# Patient Record
Sex: MALE | Race: WHITE | NOT HISPANIC OR LATINO | URBAN - METROPOLITAN AREA
[De-identification: names, ages, dates, MRNs, and addresses within clinical notes are randomized per-mention and may not be internally consistent; named-entity substitution may affect disease eponyms.]

---

## 2024-10-07 ENCOUNTER — OUTPATIENT (OUTPATIENT)
Dept: OUTPATIENT SERVICES | Facility: HOSPITAL | Age: 29
LOS: 1 days | Discharge: HOME | End: 2024-10-07
Payer: MEDICAID

## 2024-10-07 VITALS
DIASTOLIC BLOOD PRESSURE: 70 MMHG | HEART RATE: 74 BPM | RESPIRATION RATE: 15 BRPM | OXYGEN SATURATION: 98 % | TEMPERATURE: 98 F | SYSTOLIC BLOOD PRESSURE: 105 MMHG

## 2024-10-07 VITALS
WEIGHT: 196.87 LBS | TEMPERATURE: 98 F | HEIGHT: 72.01 IN | HEART RATE: 64 BPM | DIASTOLIC BLOOD PRESSURE: 78 MMHG | OXYGEN SATURATION: 98 % | RESPIRATION RATE: 18 BRPM | SYSTOLIC BLOOD PRESSURE: 122 MMHG

## 2024-10-07 DIAGNOSIS — Z87.74 PERSONAL HISTORY OF (CORRECTED) CONGENITAL MALFORMATIONS OF HEART AND CIRCULATORY SYSTEM: Chronic | ICD-10-CM

## 2024-10-07 DIAGNOSIS — M21.6X1 OTHER ACQUIRED DEFORMITIES OF RIGHT FOOT: ICD-10-CM

## 2024-10-07 PROCEDURE — 73630 X-RAY EXAM OF FOOT: CPT | Mod: 26,RT

## 2024-10-07 PROCEDURE — 73650 X-RAY EXAM OF HEEL: CPT | Mod: 26,59,RT

## 2024-10-07 PROCEDURE — 73600 X-RAY EXAM OF ANKLE: CPT | Mod: 26,RT

## 2024-10-07 DEVICE — ANCHOR SUT TAK MINI-BIO COMP 2.4MM 5/BX: Type: IMPLANTABLE DEVICE | Status: FUNCTIONAL

## 2024-10-07 DEVICE — IMPLANTABLE DEVICE: Type: IMPLANTABLE DEVICE | Status: FUNCTIONAL

## 2024-10-07 DEVICE — GWIRE W/ TROCAR TIP .062X9.25IN MUST ORD MULT OF 6 EA: Type: IMPLANTABLE DEVICE | Status: FUNCTIONAL

## 2024-10-07 DEVICE — ARTHREX INTERNALBRACE BIOCOMPOSITE W/FIBERTAPE & JUMPSTART: Type: IMPLANTABLE DEVICE | Status: FUNCTIONAL

## 2024-10-07 NOTE — ASU DISCHARGE PLAN (ADULT/PEDIATRIC) - NS MD DC FALL RISK RISK
For information on Fall & Injury Prevention, visit: [email or web address] OR  [email or web address] OR  https://www.cdc.gov/steadi/patient.html

## 2024-10-07 NOTE — ASU DISCHARGE PLAN (ADULT/PEDIATRIC) - PROCEDURE
s/p R scope, lateral calc slide and closing wedge osteotomy, open arthrotomy, Talus fracture ORIF , lateral ATFL repair with Augmentation

## 2024-10-07 NOTE — H&P ADULT - NSHPREVIEWOFSYSTEMS_GEN_ALL_CORE
Constitution: Denies fever, chills, fatigue, or unexpected weight changes.   Allergy/Immune: Denies skin rash or other any other signs of an allergic reaction.   Breast: Denies pain, lump, or discharge in either breast.   Cardiovasc Denies chest pain, palpitations, jaw pain, shoulder pain, anxiety, or diaphoresis,   Endocrine: Denies hot flashes, feeling cold, meed changes, or glove size changes.   ENT: Denies hearing loss, change of vision, pain, discharge of nose or eer, dizziness, or sinus pressure. Denies visual changes, eye pain, or redness. Denies abdominal pal, difficulty with swallowing, heartburn, BRBPR, melena, or changes In bowel habits.   Head/ necks: See HPI  Musculoskeletal: Ses HPI   Neuro: Treadwell headache, passing out, generalized or unilateral numbness, tingling sensation, facial droop, slurred speech, or drooling . \  Rasp: Denies shortness of breath cough, hemoptysis, wheezes, or sputum production.   skin: Denies skin or hair changes.

## 2024-10-07 NOTE — H&P ADULT - NSHPPHYSICALEXAM_GEN_ALL_CORE
Head/Scalp/Face:    NC/AT skull. No tenderness, palpable mass. Even hair distribution.    Eyes:    Normal pink conjunctiva; white sclera. PERRL, EOM intact. No jaundice or exudates.    Nose/Sinus:    Symmetrical patent nares. No nasal pharyngeal/grunting/discharge.    Chest/Lungs:    Symmetrical expansion with respiration. No intercostal retractions or tenderness. Breath sounds clear to auscultation bilaterally. No rales/wheezes/rhonchi auscultated.    Heart:    S1S2 heard. Regular rhythm. No murmurs, thrills, or clicks.    Abdomen:    Soft, non-tender, non-distended. No palpable masses. No hepatomegaly/splenomegaly. No inguinal hernia or lymphadenopathy.

## 2024-10-07 NOTE — ASU PREOPERATIVE ASSESSMENT, ADULT ([facility] ONLY) - FALL HARM RISK - UNIVERSAL INTERVENTIONS
Bed in lowest position, wheels locked, appropriate side rails in place/Call bell, personal items and telephone in reach/Instruct patient to call for assistance before getting out of bed or chair/Non-slip footwear when patient is out of bed/[place] to call system/Physically safe environment - no spills, clutter or unnecessary equipment/Purposeful Proactive Rounding/Room/bathroom lighting operational, light cord in reach

## 2024-10-07 NOTE — ASU PREOPERATIVE ASSESSMENT, ADULT ([facility] ONLY) - PROCEDURE
right ankle arthroscopy, ligament repair,calcaneal osteotomy and fallus fracture versus excision with all necessary hardware

## 2024-10-07 NOTE — ASU DISCHARGE PLAN (ADULT/PEDIATRIC) - NURSING INSTRUCTIONS
You were given IV Tylenol (1000mg) for pain management in the Operating Room.  Please do NOT take any additonal tylenol/acetaminophen products (Percocet, Vicodin, Excedrin) for the next 6-8 hours (after 6:00PM). Please do not take tylenol AND percocet/vicodin/excedrin as narcotic prescription already contains tylenol.     When taking pain/narcotic medications - take with food as it can cause nausea if taken on an empty stomach, and know it may cause constipation. To prevent constipation increase fluids and fiber in diet. You may take stool softeners (such as Colace) and follow directions as printed on bottle. - Do NOT drive while on narcotics.

## 2024-10-07 NOTE — H&P ADULT - NSHPLABSRESULTS_GEN_ALL_CORE
Vital  Date: 09/27/2024 10:36 AM, BP: 121/79 (mmHg), HT: 6'0.0" (R/Inches), WTt 197 (103/oz), BMI 26,72,  (min), Spo2 97 (%)

## 2024-10-07 NOTE — H&P ADULT - ASSESSMENT
"Fall same level from a slip/trip without striking against object.    Patient is medically cleared for the procedure.  Was seen by cardiology and cleared as low to moderate risk as well."

## 2024-10-07 NOTE — H&P ADULT - NSHPOUTPATIENTPROVIDERS_GEN_ALL_CORE
[facility]    [address]     [facility] [address]    Tel: [phone]    Fax: [phone];   ---    [address], [address]    Tel: [phone]    Fax: [phone]

## 2024-10-07 NOTE — ASU DISCHARGE PLAN (ADULT/PEDIATRIC) - CARE PROVIDER_API CALL
Arlet Morgan  Podiatric Medicine  [address] [place] [id number]  Phone: [phone]  Fax: [phone]  Follow Up Time:    Ambulatory

## 2024-10-07 NOTE — ASU DISCHARGE PLAN (ADULT/PEDIATRIC) - PATIENT EDUCATION MATERIALS PROVIED
Pre-printed instructions given for crutch/cane training/Pre-printed instructions given for nerve block

## 2024-10-07 NOTE — ASU DISCHARGE PLAN (ADULT/PEDIATRIC) - ASU DC SPECIAL INSTRUCTIONSFT
non-weight bearing to R Lower Extremity in posterior splint   - follow up with Surgeon office in one week   - keep dressing Clean Dry and Intact   - Rest, Elevation  - Take post op medication accordingly, sent to Pharmacy

## (undated) DEVICE — DRSG WEBRIL 4"

## (undated) DEVICE — DRAPE C ARM C-ARMOUR

## (undated) DEVICE — DRSG XEROFORM 1 X 8"

## (undated) DEVICE — DRSG COBAN 4"

## (undated) DEVICE — DRAPE 3/4 SHEET 52X76"

## (undated) DEVICE — ARTHREX MINI SUTURETAK FOR MINI PUSHLOCK

## (undated) DEVICE — DRSG CURITY GAUZE SPONGE 4 X 4" 12-PLY

## (undated) DEVICE — SHAVER BLADE S&N FULL RADIUS 3.5MM STRAIGHT (BEIGE)

## (undated) DEVICE — DRSG ACE BANDAGE 4" NS

## (undated) DEVICE — ARTHREX ANGEL PRP KIT

## (undated) DEVICE — WARMING BLANKET UPPER ADULT

## (undated) DEVICE — VENODYNE/SCD SLEEVE CALF MEDIUM

## (undated) DEVICE — SOL IRR POUR NS 0.9% 500ML

## (undated) DEVICE — DRAPE 1/2 SHEET 40X57"

## (undated) DEVICE — DRSG STOCKINETTE TUBULAR 6"

## (undated) DEVICE — TUBING STRYKER FOR FLOSTEADY PUMP

## (undated) DEVICE — TOURNIQUET CUFF 30" SINGLE PORT W PLC

## (undated) DEVICE — GLV 7 PROTEXIS (WHITE)

## (undated) DEVICE — TOURNIQUET ESMARK 4"

## (undated) DEVICE — DRILL BIT ARTHREX CANN 3.2MM

## (undated) DEVICE — POSITIONER STRAP FOOT ANKLE DISTRACTOR

## (undated) DEVICE — PREP CHLORAPREP HI-LITE ORANGE 26ML

## (undated) DEVICE — MARKING PEN W RULER / LABELS

## (undated) DEVICE — ELCTR ROCKER SWITCH PENCIL BLUE 10FT

## (undated) DEVICE — Device

## (undated) DEVICE — SOL IRR BAG NS 0.9% 3000ML

## (undated) DEVICE — LABELS BLANK W PEN

## (undated) DEVICE — SUT NYLON 3-0 18" PS-2

## (undated) DEVICE — NDL SPINAL 25G X 3.5" (BLUE)

## (undated) DEVICE — PACK LIJ BASIC ORTHO

## (undated) DEVICE — POSITIONER PATIENT SAFETY STRAP 3X60"

## (undated) DEVICE — SOL IRR POUR H2O 500ML

## (undated) DEVICE — NDL SPINAL 18G X 3.5" (PINK)

## (undated) DEVICE — FRAZIER SUCTION TIP 12FR

## (undated) DEVICE — ELCTR BOVIE TIP BLADE INSULATED 6.5" EDGE

## (undated) DEVICE — POSITIONER FOAM EGG CRATE ULNAR 2PCS (PINK)

## (undated) DEVICE — DRSG ADAPTIC 3 X 8"

## (undated) DEVICE — DRSG KLING 4"

## (undated) DEVICE — SHAVER BLADE S&N POWERMINI FULL RADIUS 2.9MM (RED)

## (undated) DEVICE — SAW BLADE LINVATEC SAGITTAL MIC 9.5X25.5X0.4MM